# Patient Record
Sex: FEMALE | Race: ASIAN | NOT HISPANIC OR LATINO | Employment: UNEMPLOYED | URBAN - METROPOLITAN AREA
[De-identification: names, ages, dates, MRNs, and addresses within clinical notes are randomized per-mention and may not be internally consistent; named-entity substitution may affect disease eponyms.]

---
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
## 2022-06-07 ENCOUNTER — OFFICE VISIT (OUTPATIENT)
Dept: FAMILY MEDICINE | Facility: CLINIC | Age: 22
End: 2022-06-07
Payer: COMMERCIAL

## 2022-06-07 VITALS
SYSTOLIC BLOOD PRESSURE: 106 MMHG | HEIGHT: 61 IN | OXYGEN SATURATION: 99 % | BODY MASS INDEX: 30.63 KG/M2 | TEMPERATURE: 98.3 F | HEART RATE: 78 BPM | RESPIRATION RATE: 16 BRPM | WEIGHT: 162.25 LBS | DIASTOLIC BLOOD PRESSURE: 70 MMHG

## 2022-06-07 DIAGNOSIS — Z23 IMMUNIZATION DUE: ICD-10-CM

## 2022-06-07 DIAGNOSIS — N91.2 AMENORRHEA: Primary | ICD-10-CM

## 2022-06-07 LAB — HCG UR QL: NEGATIVE

## 2022-06-07 PROCEDURE — 81025 URINE PREGNANCY TEST: CPT | Performed by: FAMILY MEDICINE

## 2022-06-07 PROCEDURE — 0064A COVID-19,PF,MODERNA (18+ YRS BOOSTER .25ML): CPT | Performed by: FAMILY MEDICINE

## 2022-06-07 PROCEDURE — 99213 OFFICE O/P EST LOW 20 MIN: CPT | Mod: 25 | Performed by: FAMILY MEDICINE

## 2022-06-07 PROCEDURE — 91306 COVID-19,PF,MODERNA (18+ YRS BOOSTER .25ML): CPT | Performed by: FAMILY MEDICINE

## 2022-06-07 RX ORDER — NORGESTIMATE AND ETHINYL ESTRADIOL 7DAYSX3 LO
1 KIT ORAL DAILY
Qty: 84 TABLET | Refills: 1 | Status: SHIPPED | OUTPATIENT
Start: 2022-06-07 | End: 2023-11-09

## 2022-06-07 NOTE — PROGRESS NOTES
ASSESMENT AND PLAN:  Amenorrhea  Negative UPT.   We will try OCPs to regulate her menstrual cycles.  Follow-up in 3 months.  - HCG qualitative urine  - norgestim-eth estrad triphasic (ORTHO TRI-CYCLEN LO) 0.18/0.215/0.25 MG-25 MCG tablet; Take 1 tablet by mouth daily    Immunization due  - COVID-19,PF,MODERNA (18+ YRS BOOSTER .25ML)    This transcription uses voice recognition software, which may contain typographical errors.      SUBJECTIVE: Yunior Stuart is here to follow-up on irregular menstrual cycles.  She saw Dr. Harrison in  November 2021, labs were unremarkable.  She was given Provera in December, had her period for 5 days in December.  No menstrual periods for the past 6 months again.  Menarche started at age 15, regular menstrual cycles until she turned 21.  She denied being sexually active.  No spotting.  No cramping.        Allergies:  No Known Allergies    History   Smoking Status     Never Smoker   Smokeless Tobacco     Never Used       Review of systems otherwise negative except as listed in HPI.   History   Smoking Status     Never Smoker   Smokeless Tobacco     Never Used       OBJECTICE: There were no vitals taken for this visit.    DATA REVIEWED:    Labs Reviewed or Ordered (1):      GEN-alert,  in no apparent distress.  HEENT- neck is supple.  CV-regular rate and rhythm with no murmur.   RESP-lungs clear to auscultation .          Edilson Hsu MD   6/7/2022   Answers for HPI/ROS submitted by the patient on 6/7/2022  What is the reason for your visit today? : Menstrual

## 2023-01-23 ENCOUNTER — OFFICE VISIT (OUTPATIENT)
Dept: FAMILY MEDICINE | Facility: CLINIC | Age: 23
End: 2023-01-23
Payer: COMMERCIAL

## 2023-01-23 VITALS
SYSTOLIC BLOOD PRESSURE: 124 MMHG | RESPIRATION RATE: 16 BRPM | HEART RATE: 72 BPM | OXYGEN SATURATION: 98 % | TEMPERATURE: 97.9 F | BODY MASS INDEX: 30.21 KG/M2 | DIASTOLIC BLOOD PRESSURE: 85 MMHG | WEIGHT: 159 LBS

## 2023-01-23 DIAGNOSIS — H66.91 ACUTE OTITIS MEDIA OF RIGHT EAR WITH PERFORATED TYMPANIC MEMBRANE: Primary | ICD-10-CM

## 2023-01-23 DIAGNOSIS — H72.91 ACUTE OTITIS MEDIA OF RIGHT EAR WITH PERFORATED TYMPANIC MEMBRANE: Primary | ICD-10-CM

## 2023-01-23 PROCEDURE — 99213 OFFICE O/P EST LOW 20 MIN: CPT | Performed by: STUDENT IN AN ORGANIZED HEALTH CARE EDUCATION/TRAINING PROGRAM

## 2023-01-23 NOTE — PATIENT INSTRUCTIONS
You have a right ear infection and your ear drum ruptured.  This will heal on its own.  Take Augmentin as directed.  Do not get water in your ear. No swimming or baths.  Use a cotton ball in your ear when you shower.   Your hearing will improve as the ear drum heals.

## 2023-01-23 NOTE — PROGRESS NOTES
ASSESSMENT & PLAN:   Diagnoses and all orders for this visit:  Acute otitis media of right ear with perforated tympanic membrane  -     amoxicillin-clavulanate (AUGMENTIN) 875-125 MG tablet; Take 1 tablet by mouth 2 times daily for 10 days    Right AOM with perforated eardrum -Augmentin x10 days. Advised water precautions. Follow-up with any worsening symptoms.    No follow-ups on file.    Patient Instructions   You have a right ear infection and your ear drum ruptured.  This will heal on its own.  Take Augmentin as directed.  Do not get water in your ear. No swimming or baths.  Use a cotton ball in your ear when you shower.   Your hearing will improve as the ear drum heals.       At the end of the encounter, I discussed results, diagnosis, medications. Discussed red flags for immediate return to clinic/ER, as well as indications for follow up if no improvement. Patient and/or caregiver understood and agreed to plan. Patient was stable for discharge.    ------------------------------------------------------------------------  SUBJECTIVE  Patient presents with:  Otalgia: X 1 year. On and off. Some yellow discharge. Worsening pain x 1 week. Decrease in hearing. No fever.    HPI  Yunior Stuart is a(n) 22 year old female presenting to clinic today for right ear pain x1 week. Also has yellow drainage from ear. Has had decreased hearing on right. No fever. No other cold symptoms.      used throughout visit.    Review of Systems    Current Outpatient Medications   Medication Sig Dispense Refill     amoxicillin-clavulanate (AUGMENTIN) 875-125 MG tablet Take 1 tablet by mouth 2 times daily for 10 days 20 tablet 0     norgestim-eth estrad triphasic (ORTHO TRI-CYCLEN LO) 0.18/0.215/0.25 MG-25 MCG tablet Take 1 tablet by mouth daily 84 tablet 1     medroxyPROGESTERone (PROVERA) 10 MG tablet Take 1 tablet (10 mg) by mouth daily (Patient not taking: Reported on 6/7/2022) 10 tablet 0     Problem List:  There are no relevant  problems documented for this patient.    No Known Allergies      OBJECTIVE  Vitals:    01/23/23 1712   BP: 124/85   BP Location: Right arm   Patient Position: Sitting   Cuff Size: Adult Regular   Pulse: 72   Resp: 16   Temp: 97.9  F (36.6  C)   TempSrc: Oral   SpO2: 98%   Weight: 72.1 kg (159 lb)     Physical Exam   GENERAL: healthy, alert, no acute distress.   HEAD: normocephalic, atraumatic.  EYE: PERRL. EOMs intact. No scleral injection bilaterally.   EAR: external ear normal. Bilateral ear canals normal and nonpainful. Right TM erythematous and perforated. Left TM intact, pearly, translucent without bulging.    No results found for any visits on 01/23/23.

## 2023-09-12 ENCOUNTER — TELEPHONE (OUTPATIENT)
Dept: FAMILY MEDICINE | Facility: CLINIC | Age: 23
End: 2023-09-12
Payer: COMMERCIAL

## 2023-09-12 NOTE — TELEPHONE ENCOUNTER
Patient Quality Outreach    Patient is due for the following:   Cervical Cancer Screening - PAP Needed    Next Steps:   Schedule a Adult Preventative    Type of outreach:    Phone, spoke to patient/parent. Will call back when she is available.      Questions for provider review:    None           Lisa Birmingham MA

## 2023-10-09 ENCOUNTER — OFFICE VISIT (OUTPATIENT)
Dept: FAMILY MEDICINE | Facility: CLINIC | Age: 23
End: 2023-10-09
Payer: COMMERCIAL

## 2023-10-09 VITALS
DIASTOLIC BLOOD PRESSURE: 72 MMHG | HEART RATE: 78 BPM | TEMPERATURE: 97.5 F | HEIGHT: 61 IN | SYSTOLIC BLOOD PRESSURE: 120 MMHG | RESPIRATION RATE: 16 BRPM | BODY MASS INDEX: 28.91 KG/M2 | WEIGHT: 153.12 LBS | OXYGEN SATURATION: 98 %

## 2023-10-09 DIAGNOSIS — Z30.09 BIRTH CONTROL COUNSELING: Primary | ICD-10-CM

## 2023-10-09 DIAGNOSIS — Z12.4 CERVICAL CANCER SCREENING: ICD-10-CM

## 2023-10-09 DIAGNOSIS — Z30.430 ENCOUNTER FOR INSERTION OF INTRAUTERINE CONTRACEPTIVE DEVICE: ICD-10-CM

## 2023-10-09 DIAGNOSIS — Z11.3 SCREENING FOR STDS (SEXUALLY TRANSMITTED DISEASES): ICD-10-CM

## 2023-10-09 LAB — HCG UR QL: NEGATIVE

## 2023-10-09 PROCEDURE — 87491 CHLMYD TRACH DNA AMP PROBE: CPT | Performed by: FAMILY MEDICINE

## 2023-10-09 PROCEDURE — 87591 N.GONORRHOEAE DNA AMP PROB: CPT | Performed by: FAMILY MEDICINE

## 2023-10-09 PROCEDURE — G0145 SCR C/V CYTO,THINLAYER,RESCR: HCPCS | Performed by: FAMILY MEDICINE

## 2023-10-09 PROCEDURE — 58300 INSERT INTRAUTERINE DEVICE: CPT | Performed by: FAMILY MEDICINE

## 2023-10-09 PROCEDURE — 81025 URINE PREGNANCY TEST: CPT | Performed by: FAMILY MEDICINE

## 2023-10-09 RX ORDER — IBUPROFEN 200 MG
600 TABLET ORAL ONCE
Status: COMPLETED | OUTPATIENT
Start: 2023-10-09 | End: 2023-10-09

## 2023-10-09 RX ADMIN — Medication 600 MG: at 10:09

## 2023-10-09 NOTE — PROGRESS NOTES
"IUD Insertion:  CONSULT:    Is a pregnancy test required: Yes.  Was it positive or negative?  Negative  Was a consent obtained?  Yes    Subjective: Yunior Stuart is a 23 year old No obstetric history on file. presents for IUD and desires Mirena type IUD.    Patient has been given the opportunity to ask questions about all forms of birth control, including all options appropriate for Yunior Stuart. Discussed that no method of birth control, except abstinence is 100% effective against pregnancy or sexually transmitted infection.     Yunior Stuart understands she may have the IUD removed at any time. IUD should be removed by a health care provider.    The entire insertion procedure was reviewed with the patient, including care after placement.    Patient's last menstrual period was 10/01/2023 (approximate). Last sexual activity: over 2 weeks ago . No allergy to betadine or shellfish. Patient desires STD screening  hCG Urine Qualitative   Date Value Ref Range Status   10/09/2023 Negative Negative Final     Comment:     This test is for screening purposes.  Results should be interpreted along with the clinical picture.  Confirmation testing is available if warranted by ordering [id number], HCG Quantitative Pregnancy.         /72   Pulse 78   Temp 97.5  F (36.4  C) (Temporal)   Resp 16   Ht 1.545 m (5' 0.83\")   Wt 69.5 kg (153 lb 1.9 oz)   LMP 10/01/2023 (Approximate)   SpO2 98%   BMI 29.09 kg/m      Pelvic Exam:   EG/BUS: normal genital architecture without lesions, erythema or abnormal secretions.   Vagina: moist, pink, rugae with physiologic discharge and secretions  Cervix: nuliparous no lesions and pink, moist, closed, without lesion or CMT  Uterus: central position, mobile, no pain  Adnexa: within normal limits and no masses, nodularity, tenderness    PROCEDURE NOTE: -- IUD Insertion    Reason for Insertion: contraception    Premedicated with ibuprofen.  Under sterile technique, cervix was visualized with speculum and " prepped with Betadine solution swab x 3. Tenaculum was placed for stability. The uterus was gently straightened and sounded to 7.5 cm. IUD prepared for placement, and IUD inserted according to 's instructions without difficulty or significant resitance, and deployed at the fundus. The strings were visualized and trimmed to 5.0 cm from the external os. Tenaculum was removed and hemostasis noted. Speculum removed.  Patient tolerated procedure well.    Lot # [id number]   Exp: 2/1/24    EBL: minimal    Complications: none    Mu was seen today for contraception.    Diagnoses and all orders for this visit:    Birth control counseling  -     HCG qualitative urine; Future  -     ibuprofen (ADVIL/MOTRIN) tablet 600 mg  -     Chlamydia trachomatis/Neisseria gonorrhoeae by PCR - Clinic Collect  -     HCG qualitative urine    Encounter for insertion of intrauterine contraceptive device  -     levonorgestrel (MIRENA) 52 MG (20 mcg/day) IUD 1 each  -     INSERTION INTRAUTERINE DEVICE    Screening for STDs (sexually transmitted diseases)    Cervical cancer screening  -     Pap Screen only - recommended age 21 - 24 years    Other orders  -     PRIMARY CARE FOLLOW-UP SCHEDULING; Future        Given 's handouts, including when to have IUD removed, list of danger s/sx, side effects and follow up recommended. Encouraged condom use for prevention of STD. Back up contraception advised for 7 days if progestin method. Advised to call for any fever, for prolonged or severe pain or bleeding, abnormal vaginal discharge, or unable to palpate strings. She was advised to use pain medications (ibuprofen) as needed for mild to moderate pain. Advised to follow-up in clinic in 4-6 weeks for IUD string check if unable to find strings or as directed by provider.     Corinna Sepulveda MD

## 2023-10-09 NOTE — COMMUNITY RESOURCES LIST (ENGLISH)
10/09/2023   [facility]  N/A  For questions about this resource list or additional care needs, please contact your primary care clinic or care manager.  Phone: [phone]   Email: N/A   Address: [address] [place] [id number]   Hours: N/A        Hotlines and Helplines       Hotline - Housing crisis  1  [facility] Distance: 8.99 miles      Phone/Virtual   [address] [place] [address]  Language: English  Hours: Mon - Sun Open 24 Hours   Phone: (264) 553-9854 Email: communications@[facility].org Website: https://[facility]-[place].org/oursaviourshousing/     2  [facility] Distance: 10.55 miles      Phone/Virtual   [address] [place] [address]  Language: English  Hours: Mon - Sun Open 24 Hours   Phone: (775) 315-1910 Email: info@[facility].org Website: http://www.[facility].org          Housing       Coordinated Entry access point  3  [facility] Distance: 3.92 miles      In-Person, Phone/Virtual   [address] [place] [address]  Language: English, Filipino  Hours: Mon - Fri 8:30 AM - 4:30 PM  Fees: Free   Phone: (135) 297-8616 Email: info@[facility].org Website: https://www.[facility].org/locations/[facility]-clinic/     4  [facility] - Coordinated Access to Housing and Shelter ([facility]) - Coordinated Access Distance: 4.59 miles      In-Person, Phone/Virtual   [address] [place] [address]  Language: English  Hours: Mon - Fri 8:00 AM - 4:30 PM  Fees: Free   Phone: (218) 751-5451 Website: https://www.[facility]./residents/assistance-support/assistance/housing-services-support     Drop-in center or day shelter  5  [facility] Distance: 3.09 miles      In-Person   [address] [place] [address]  Language: English  Hours: Mon - Fri 9:00 AM - 4:00 PM  Fees: Free   Phone: (785) 833-3446 Email: frontmichealk@[employer].org Website:  http://[facility].org     6  [facility] Distance: 3.67 miles      In-Person   [address] [place] [address]  Language: English  Hours: Mon - Fri 10:00 AM - 6:00 PM  Fees: Free   Phone: (865) 518-7200 Email: [employer] Website: https://[employer]/support/youth/     Housing search assistance  7  [facility] Distance: 3.67 miles      In-Person, Phone/Virtual   [address] [place] [address]  Language: English  Hours: Mon - Fri 10:00 AM - 6:00 PM  Fees: Free   Phone: (455) 944-9754 Email: development@[employer] Website: https://[employer]/support/youth/     8  [facility] - Housing Search Assistance Distance: 5 miles      Phone/Virtual   [address] [place] [address]  Language: Lithuanian, English, Hmong, Flaca, Luxembourger, Maldivian  Hours: Mon - Fri Appt. Only  Fees: Free   Phone: (327) 744-4239 Website: https://"[employer].org/     Shelter for families  9  [facility] Distance: 11.67 miles      In-Person   [address] [place] [address]  Language: English  Hours: Mon - Fri 3:00 PM - 9:00 AM , Sat - Sun Open 24 Hours  Fees: Free   Phone: (852) 710-1115 Ext.1 Website: https://www.[facility].org/2020/07/03/emergency-family-shelter/     Shelter for individuals  10  [facility] [place] [facility] [place] [facility] Distance: 3.9 miles      In-Person   [address] [place] [address]  Language: English  Hours: Mon - Sun 5:00 PM - 10:00 AM  Fees: Free, Self Pay   Phone: (291) 435-7891 Email: info@[employer] Website: https://www.[employer].org/locations/[facility]/     11  [facility] Distance: 8.99 miles      In-Person   [address] [place] [address]  Language: English  Hours: Mon - Sun Open 24 Hours  Fees: Free   Phone: (708) 863-2685 Email: communications@[facility].org  Website: https://[facility]-[place].org/oursaviourshousing/     Shelter for youth  12  [facility] - Emergency Youth Shel[facility] Distance: 4.01 miles      In-Person   [address] [place] [address]  Language: English  Hours: Mon - Sun Open 24 Hours  Fees: Free   Phone: (823) 787-7868 Email: ambreen@[facility] Website: https://[facility].org/[facility]/jflvv-krwjg-ivizi/     13  [facility] - Emergency Youth Shelter Distance: 8.36 miles      In-Person   [address] [place] [address]  Language: English  Hours: Mon - Sun Open 24 Hours  Fees: Free   Phone: (813) 324-5127 Email: info@[employer] Website: https://www.[facility].org/locations/hope-[place]/          Important Numbers & Websites       Emergency Services   [address]  [place] [address]  Poison Control   (183) 474-6871  Suicide Prevention Lifeline   (574) 512-2689 (TALK)  Child Abuse Hotline   (369) 915-3537 (4-A-Child)  Sexual Assault Hotline   (520) 969-8009 (HOPE)  National Runaway Safeline   (320) 608-8690 (RUNAWAY)  All-Options Talkline   (232) 345-1704  Substance Abuse Referral   (937) 362-6381 (HELP)

## 2023-10-09 NOTE — PATIENT INSTRUCTIONS
IUD information:     Benefits: The IUD can be 97-99% effective when carefully following directions regarding use. It can be more effective if used with additional contraception. IUD containing progestin may decrease menstrual flow and menstrual cramping.     Risks/Side Effects: include but are not limited to spotting, bleeding, hemorrhage, or anemia: cramping or pain: partial or complete expulsion of device; lost IUD strings; uterine or cervical perforation; embedding of IUD in the uterine wall; increased risk of pelvic inflammatory disease. Women who become pregnant with an IUD in place are at a higher risk for ectopic pregnancy should a pregnancy occur with an IUD in situ. There is a higher rate of miscarriage when pregnancy occurs with IUD in place.    Warning signs: Please call clinic if you have abnormal spotting or heavy bleeding, abdominal pain, dyspareunia, fever, chills, flu like symptoms, or unable to locate strings of IUD, or strings are longer or shorter than expected.    You are encouraged to use condoms for prevention of STD. You may also need back up contraception for 7 days with your IUD. You may use pain medications (ibuprofen) as needed for mild to moderate pain. Please follow-up in clinic in 4-6 weeks for IUD string check if unable to find strings or as directed by provider.

## 2023-10-10 LAB
C TRACH DNA SPEC QL PROBE+SIG AMP: NEGATIVE
N GONORRHOEA DNA SPEC QL NAA+PROBE: NEGATIVE

## 2023-10-11 LAB
BKR LAB AP GYN ADEQUACY: NORMAL
BKR LAB AP GYN INTERPRETATION: NORMAL
BKR LAB AP HPV REFLEX: NO
BKR LAB AP LMP: NORMAL
BKR LAB AP PREVIOUS ABNORMAL: NORMAL
PATH REPORT.COMMENTS IMP SPEC: NORMAL
PATH REPORT.COMMENTS IMP SPEC: NORMAL
PATH REPORT.RELEVANT HX SPEC: NORMAL

## 2023-11-09 ENCOUNTER — OFFICE VISIT (OUTPATIENT)
Dept: FAMILY MEDICINE | Facility: CLINIC | Age: 23
End: 2023-11-09
Attending: FAMILY MEDICINE
Payer: COMMERCIAL

## 2023-11-09 VITALS
TEMPERATURE: 98 F | HEIGHT: 61 IN | DIASTOLIC BLOOD PRESSURE: 65 MMHG | OXYGEN SATURATION: 99 % | BODY MASS INDEX: 28.89 KG/M2 | SYSTOLIC BLOOD PRESSURE: 100 MMHG | HEART RATE: 77 BPM | WEIGHT: 153 LBS | RESPIRATION RATE: 16 BRPM

## 2023-11-09 DIAGNOSIS — Z97.5 IUD (INTRAUTERINE DEVICE) IN PLACE: Primary | ICD-10-CM

## 2023-11-09 DIAGNOSIS — Z23 IMMUNIZATION DUE: ICD-10-CM

## 2023-11-09 DIAGNOSIS — N92.6 IRREGULAR MENSTRUAL BLEEDING: ICD-10-CM

## 2023-11-09 PROCEDURE — 99213 OFFICE O/P EST LOW 20 MIN: CPT | Mod: 25 | Performed by: FAMILY MEDICINE

## 2023-11-09 PROCEDURE — 90686 IIV4 VACC NO PRSV 0.5 ML IM: CPT | Performed by: FAMILY MEDICINE

## 2023-11-09 PROCEDURE — 90471 IMMUNIZATION ADMIN: CPT | Performed by: FAMILY MEDICINE

## 2023-11-09 RX ORDER — NORGESTIMATE AND ETHINYL ESTRADIOL 0.25-0.035
1 KIT ORAL DAILY
Qty: 84 TABLET | Refills: 0 | Status: SHIPPED | OUTPATIENT
Start: 2023-11-09 | End: 2024-01-25

## 2023-11-09 RX ORDER — NAPROXEN 500 MG/1
500 TABLET ORAL 2 TIMES DAILY PRN
Qty: 60 TABLET | Refills: 3 | Status: SHIPPED | OUTPATIENT
Start: 2023-11-09

## 2023-11-09 NOTE — LETTER
November 9, 2023      Yunior Stuart  [address] [facility] [address]        To Whom It May Concern:    Yunior Stuart  was seen on 11/9/23.  Please excuse her  tardy/absence form work.      Sincerely,        Corinna Sepulveda MD

## 2023-11-09 NOTE — PROGRESS NOTES
"  Assessment & Plan       Immunization due  - INFLUENZA VACCINE IM > 6 MONTHS VALENT IIV4 (AFLURIA/FLUZONE)    Irregular menstrual bleeding  IUD (intrauterine device) in place  Had not had a period in over 2 years, bleeding daily since a few days after IUD was placed. Up to one pad per day. No dizziness. Should get lighter over time, she does want to try ocps - was on it before because she would go over a year without a period often. If not improved with nsaids or ocps, consider imaging or removal of iud.   - norgestimate-ethinyl estradiol (ORTHO-CYCLEN) 0.25-35 MG-MCG tablet  Dispense: 84 tablet; Refill: 0  - naproxen (NAPROSYN) 500 MG tablet  Dispense: 60 tablet; Refill: 3         BMI:   Estimated body mass index is 29.07 kg/m  as calculated from the following:    Height as of this encounter: 1.545 m (5' 0.83\").    Weight as of this encounter: 69.4 kg (153 lb).   Weight management plan: Patient was referred to their PCP to discuss a diet and exercise plan.    Return in about 2 months (around 1/9/2024) for iud check - bleeding.      Corinna Sepulveda MD  [facility] [place] Mojica is a 23 year old, presenting for the following health issues:  Contraception (Follow up IUD string check. )      History of Present Illness       Reason for visit:  Follow up    She eats 0-1 servings of fruits and vegetables daily.She consumes 1 sweetened beverage(s) daily.She exercises with enough effort to increase her heart rate 9 or less minutes per day.  She exercises with enough effort to increase her heart rate 3 or less days per week.   She is taking medications regularly.       IUD placed 10/9/23.   Bleeding since 3 days after iud placement  Like a period, not too heavy    Review of Systems   Constitutional, HEENT, cardiovascular, pulmonary, gi and gu systems are negative, except as otherwise noted.      Objective    /65   Pulse 77   Temp 98  F (36.7  C) (Oral)   Resp 16   Ht 1.545 m (5' 0.83\")   " Wt 69.4 kg (153 lb)   LMP 10/01/2023 (Approximate)   SpO2 99%   Breastfeeding No   BMI 29.07 kg/m    Body mass index is 29.07 kg/m .  Physical Exam   GENERAL: healthy, alert and no distress  NECK: no adenopathy, no asymmetry, masses, or scars and thyroid normal to palpation  RESP: lungs clear to auscultation - no rales, rhonchi or wheezes  CV: regular rate and rhythm, normal S1 S2, no S3 or S4, no murmur, click or rub, no peripheral edema and peripheral pulses strong  ABDOMEN: soft, nontender, no hepatosplenomegaly, no masses and bowel sounds normal   (female): normal female external genitalia, normal urethral meatus, vaginal mucosa, normal cervix/adnexa/uterus without masses or discharge. Moderate amount of blood in vault, very minimal active bleeding from cervix, no tenderness, no edema. IUD strings 2-3 cm in length.   MS: no gross musculoskeletal defects noted, no edema

## 2024-01-25 ENCOUNTER — OFFICE VISIT (OUTPATIENT)
Dept: FAMILY MEDICINE | Facility: CLINIC | Age: 24
End: 2024-01-25
Payer: COMMERCIAL

## 2024-01-25 VITALS
HEIGHT: 61 IN | WEIGHT: 155.25 LBS | BODY MASS INDEX: 29.31 KG/M2 | RESPIRATION RATE: 18 BRPM | OXYGEN SATURATION: 100 % | HEART RATE: 86 BPM | TEMPERATURE: 98 F

## 2024-01-25 DIAGNOSIS — Z97.5 IUD (INTRAUTERINE DEVICE) IN PLACE: Primary | ICD-10-CM

## 2024-01-25 DIAGNOSIS — N92.6 IRREGULAR MENSTRUAL BLEEDING: ICD-10-CM

## 2024-01-25 PROCEDURE — 99213 OFFICE O/P EST LOW 20 MIN: CPT | Performed by: FAMILY MEDICINE

## 2024-01-25 NOTE — PROGRESS NOTES
"  Assessment & Plan     IUD (intrauterine device) in place  Irregular menstrual bleeding  IUD in place. Bleeding is still heavy but gradually lighter. No significant cramping. Discussed options - should continue to get lighter but if any heavy bleeding, cramping, or wanting it out - call clinic and we can do that.         Subjective   Yunior is a 23 year old, presenting for the following health issues:  IUD (IUD check. Bleeding more than 7 days. )    History of Present Illness       Reason for visit:  Follwer up    She eats 0-1 servings of fruits and vegetables daily.She consumes 2 sweetened beverage(s) daily.She exercises with enough effort to increase her heart rate 9 or less minutes per day.  She exercises with enough effort to increase her heart rate 3 or less days per week. She is missing 1 dose(s) of medications per week.       Mirena placed after several months without period. Had constant period for 2 weeks, trial OCPs to make it stop. Return visit today. If still bleeding, can remove. Otherwise no pain or cramping.             Objective    Pulse 86   Temp 98  F (36.7  C) (Oral)   Resp 18   Ht 1.54 m (5' 0.63\")   Wt 70.4 kg (155 lb 4 oz)   SpO2 100%   BMI 29.69 kg/m    Body mass index is 29.69 kg/m .  Physical Exam   GENERAL: alert and no distress   (female): External genitalia is without lesions. Introitus is normal, vaginal walls pink and moist without lesions or evidence of trauma. No cervical motion tenderness. No adnexal masses. No cervical lesions or discharge. IUD strings visualized.       No results found for any visits on 01/25/24.  No results found for this or any previous visit (from the past 24 hour(s)).        Signed Electronically by: Corinna Sepulveda MD    "